# Patient Record
Sex: FEMALE | Race: AMERICAN INDIAN OR ALASKA NATIVE
[De-identification: names, ages, dates, MRNs, and addresses within clinical notes are randomized per-mention and may not be internally consistent; named-entity substitution may affect disease eponyms.]

---

## 2019-02-21 ENCOUNTER — HOSPITAL ENCOUNTER (OUTPATIENT)
Dept: HOSPITAL 31 - C.SDS | Age: 49
Discharge: HOME | End: 2019-02-21
Attending: OBSTETRICS & GYNECOLOGY
Payer: COMMERCIAL

## 2019-02-21 VITALS
OXYGEN SATURATION: 99 % | HEART RATE: 63 BPM | SYSTOLIC BLOOD PRESSURE: 114 MMHG | TEMPERATURE: 98 F | DIASTOLIC BLOOD PRESSURE: 55 MMHG

## 2019-02-21 VITALS — BODY MASS INDEX: 28.8 KG/M2

## 2019-02-21 VITALS — RESPIRATION RATE: 15 BRPM

## 2019-02-21 DIAGNOSIS — I10: ICD-10-CM

## 2019-02-21 DIAGNOSIS — K21.9: ICD-10-CM

## 2019-02-21 DIAGNOSIS — N84.1: ICD-10-CM

## 2019-02-21 DIAGNOSIS — I34.1: ICD-10-CM

## 2019-02-21 DIAGNOSIS — I47.1: ICD-10-CM

## 2019-02-21 DIAGNOSIS — G90.1: ICD-10-CM

## 2019-02-21 DIAGNOSIS — D25.0: Primary | ICD-10-CM

## 2019-02-21 DIAGNOSIS — Z79.899: ICD-10-CM

## 2019-02-21 DIAGNOSIS — N88.2: ICD-10-CM

## 2019-02-21 PROCEDURE — 57421 EXAM/BIOPSY OF VAG W/SCOPE: CPT

## 2019-02-21 PROCEDURE — 58561 HYSTEROSCOPY REMOVE MYOMA: CPT

## 2019-02-21 PROCEDURE — 88305 TISSUE EXAM BY PATHOLOGIST: CPT

## 2019-02-22 NOTE — OP
PROCEDURE DATE:  02/21/2019



PREOPERATIVE DIAGNOSIS:  Atypical granular cell submucosal myoma.



POSTOPERATIVE DIAGNOSIS:  Atypical granular cell submucosal myoma.



ANESTHESIA:  General LMA.



PROCEDURE PERFORMED:  Hysteroscopic myomectomy, polypectomy, dilation and

curettage, colposcopy with cervical biopsy.



OPERATIVE FINDINGS:  Cervical stenosis with _____ at 6 o'clock cervical

biopsy obtained.  Uterus was sounded to 8 cm.  Cervical stenosis noted. 

Bilateral ostia visualized with polypoid mass and myoma noted, anterior

protruding within the cavity.  MyoSure device was inserted.



ESTIMATED BLOOD LOSS:  10 mL.



BLOOD PRODUCTS:  None.



COMPLICATIONS:  None.



SPECIMENS:  Endocervical curettings, endometrial curettings, submucosal

myoma, endometrial polyps, cervical biopsy at 6 o'clock.



DESCRIPTION OF PROCEDURE:  The patient was taken to the operating room

where she was given general anesthesia.  Once it was found to be adequate,

she was placed on the operating table in dorsal supine position with legs

supported using stirrups.  The patient was then prepped and draped in the

usual sterile fashion.  A time-out confirmed correct patient and correct

procedure.  Bimanual exam was performed.  Camargo retractor was placed on the

anterior and posterior fornix of the vagina.  A single-tooth tenaculum was

placed in the anterior lip of the cervix.  Endocervical curettings were

obtained with a Kevjusian curette.  The uterus was then sounded to 7 cm

following which the cervix was sequentially dilated to allow for

introduction of the hysteroscope under direct visualization and the masses

were noted, bilateral ostia visualized.  The MyoSure device was then

carefully _____ tissue noted within the right cornua area which was

carefully resected.  The MyoSure then was removed.  Following this, a

gentle curettage was done.  Following this, Lugol solution was applied to

the cervix where areas of _____ were noted.  A 6 o'clock cervical biopsy

was performed.  All instruments were removed.  There was good hemostasis at

the tenaculum puncture site.  Pressure was applied to the operative site. 

Good hemostasis noted.  At end of the procedure, all needle, sponge, and

instrument counts were noted and correct x2.  The patient tolerated the

procedure well and was transferred to the recovery room in stable

condition.







__________________________________________

Jeannette Shankar MD





DD:  02/21/2019 10:59:41

DT:  02/21/2019 12:19:51

[facility] # [id number]

## 2019-03-13 ENCOUNTER — HOSPITAL ENCOUNTER (OUTPATIENT)
Dept: HOSPITAL 42 - RAD | Age: 49
End: 2019-03-13
Payer: COMMERCIAL